# Patient Record
Sex: FEMALE | Race: WHITE | NOT HISPANIC OR LATINO | URBAN - METROPOLITAN AREA
[De-identification: names, ages, dates, MRNs, and addresses within clinical notes are randomized per-mention and may not be internally consistent; named-entity substitution may affect disease eponyms.]

---

## 2024-01-15 PROCEDURE — 87328 CRYPTOSPORIDIUM AG IA: CPT

## 2024-01-15 PROCEDURE — 87329 GIARDIA AG IA: CPT

## 2024-01-16 ENCOUNTER — LAB REQUISITION (OUTPATIENT)
Dept: LAB | Facility: HOSPITAL | Age: 39
End: 2024-01-16
Payer: COMMERCIAL

## 2024-01-16 DIAGNOSIS — B82.9 INTESTINAL PARASITISM, UNSPECIFIED: ICD-10-CM

## 2024-01-19 LAB
CRYPTOSP AG STL QL IA: NEGATIVE
G LAMBLIA AG STL QL IA: NEGATIVE

## 2024-01-20 LAB — O+P STL MICRO: NEGATIVE

## 2024-10-18 ENCOUNTER — OFFICE VISIT (OUTPATIENT)
Dept: URGENT CARE | Age: 39
End: 2024-10-18
Payer: COMMERCIAL

## 2024-10-18 VITALS
HEART RATE: 64 BPM | TEMPERATURE: 98.4 F | SYSTOLIC BLOOD PRESSURE: 126 MMHG | RESPIRATION RATE: 18 BRPM | OXYGEN SATURATION: 98 % | DIASTOLIC BLOOD PRESSURE: 87 MMHG

## 2024-10-18 DIAGNOSIS — R21 RASH: Primary | ICD-10-CM

## 2024-10-18 RX ORDER — TRIAMCINOLONE ACETONIDE 5 MG/G
CREAM TOPICAL 3 TIMES DAILY
Qty: 15 G | Refills: 0 | Status: SHIPPED | OUTPATIENT
Start: 2024-10-18

## 2024-10-18 RX ORDER — METHYLPREDNISOLONE SODIUM SUCCINATE 125 MG/2ML
125 INJECTION INTRAMUSCULAR; INTRAVENOUS ONCE
Status: COMPLETED | OUTPATIENT
Start: 2024-10-18 | End: 2024-10-18

## 2024-10-18 NOTE — PROGRESS NOTES
Subjective   History of Present Illness: Anna Lopez is a 39 y.o. female. They present today with a chief complaint of a pruritic, burning rash on arms and legs x 2 days. She doesn't know what caused it. No recent changes in her detergents, no new meds, applied OTC anti itching cream w/o relie to her symptoms.     Past Medical History  Allergies as of 10/18/2024    (No Known Allergies)       (Not in a hospital admission)       No past medical history on file.    No past surgical history on file.         Review of Systems  Review of Systems                               Objective    Vitals:    10/18/24 1439   BP: 126/87   Pulse: 64   Resp: 18   Temp: 36.9 °C (98.4 °F)   SpO2: 98%     No LMP recorded.    Physical Exam  HENT:      Head: Normocephalic and atraumatic.      Nose: Nose normal.      Mouth/Throat:      Mouth: Mucous membranes are moist.   Eyes:      Extraocular Movements: Extraocular movements intact.      Conjunctiva/sclera: Conjunctivae normal.      Pupils: Pupils are equal, round, and reactive to light.   Cardiovascular:      Rate and Rhythm: Normal rate and regular rhythm.   Pulmonary:      Effort: Pulmonary effort is normal.      Breath sounds: Normal breath sounds.   Skin:     General: Skin is warm.      Findings: Rash present. Rash is macular and papular.      Comments: And some welts with clear drainage   Neurological:      Mental Status: She is alert and oriented to person, place, and time.   Psychiatric:         Mood and Affect: Mood normal.         Behavior: Behavior normal.             Point of Care Test & Imaging Results from this visit  No results found for this visit on 10/18/24.   No results found.    Diagnostic study results (if any) were reviewed by Anselmo Yang PA-C.    Assessment/Plan   Allergies, medications, history, and pertinent labs/EKGs/Imaging reviewed by Anselmo Yang PA-C.     Medical Decision Making  -         Patient is educated about their diagnoses.     -           Discussed medications benefits and adverse effects.     -          Answered all patient’s questions.     -          Patient will call 911 or go to the nearest ED if worsen symptoms .     -          Patient is agreeable to the plan of care and is deemed stable upon discharge.     -          Follow up with your primary care provider in two days.      Orders and Diagnoses  Diagnoses and all orders for this visit:  Rash  -     methylPREDNISolone sodium succinate (PF) (SOLU-Medrol) injection 125 mg  -     triamcinolone (Kenalog) 0.5 % cream; Apply topically 3 times a day.      Medical Admin Record      Patient disposition: Home    Electronically signed by Anselmo Yang PA-C  3:07 PM